# Patient Record
Sex: FEMALE | Race: WHITE | URBAN - METROPOLITAN AREA
[De-identification: names, ages, dates, MRNs, and addresses within clinical notes are randomized per-mention and may not be internally consistent; named-entity substitution may affect disease eponyms.]

---

## 2017-01-01 ENCOUNTER — TELEPHONE (OUTPATIENT)
Dept: FAMILY MEDICINE | Facility: CLINIC | Age: 72
End: 2017-01-01

## 2017-01-01 ENCOUNTER — OFFICE VISIT (OUTPATIENT)
Dept: FAMILY MEDICINE | Facility: CLINIC | Age: 72
End: 2017-01-01
Payer: COMMERCIAL

## 2017-01-01 ENCOUNTER — CARE COORDINATION (OUTPATIENT)
Dept: CASE MANAGEMENT | Facility: CLINIC | Age: 72
End: 2017-01-01

## 2017-01-01 ENCOUNTER — DOCUMENTATION ONLY (OUTPATIENT)
Dept: CASE MANAGEMENT | Facility: CLINIC | Age: 72
End: 2017-01-01

## 2017-01-01 ENCOUNTER — MEDICAL CORRESPONDENCE (OUTPATIENT)
Dept: HEALTH INFORMATION MANAGEMENT | Facility: CLINIC | Age: 72
End: 2017-01-01

## 2017-01-01 ENCOUNTER — TRANSFERRED RECORDS (OUTPATIENT)
Dept: HEALTH INFORMATION MANAGEMENT | Facility: CLINIC | Age: 72
End: 2017-01-01

## 2017-01-01 ENCOUNTER — TELEPHONE (OUTPATIENT)
Dept: CASE MANAGEMENT | Facility: CLINIC | Age: 72
End: 2017-01-01

## 2017-01-01 VITALS
SYSTOLIC BLOOD PRESSURE: 158 MMHG | TEMPERATURE: 99.3 F | DIASTOLIC BLOOD PRESSURE: 102 MMHG | OXYGEN SATURATION: 99 % | HEART RATE: 112 BPM | WEIGHT: 159 LBS | HEIGHT: 67 IN | BODY MASS INDEX: 24.96 KG/M2

## 2017-01-01 VITALS
SYSTOLIC BLOOD PRESSURE: 143 MMHG | BODY MASS INDEX: 24.25 KG/M2 | TEMPERATURE: 97.1 F | HEART RATE: 96 BPM | WEIGHT: 156 LBS | DIASTOLIC BLOOD PRESSURE: 98 MMHG | OXYGEN SATURATION: 100 %

## 2017-01-01 VITALS
HEART RATE: 108 BPM | TEMPERATURE: 97.4 F | WEIGHT: 168 LBS | DIASTOLIC BLOOD PRESSURE: 72 MMHG | SYSTOLIC BLOOD PRESSURE: 118 MMHG | OXYGEN SATURATION: 97 % | BODY MASS INDEX: 26.12 KG/M2

## 2017-01-01 DIAGNOSIS — F41.9 ANXIETY: ICD-10-CM

## 2017-01-01 DIAGNOSIS — I10 ESSENTIAL HYPERTENSION WITH GOAL BLOOD PRESSURE LESS THAN 140/90: ICD-10-CM

## 2017-01-01 DIAGNOSIS — I10 HYPERTENSION GOAL BP (BLOOD PRESSURE) < 140/90: ICD-10-CM

## 2017-01-01 DIAGNOSIS — F10.10 ALCOHOL ABUSE: ICD-10-CM

## 2017-01-01 DIAGNOSIS — M25.512 LEFT SHOULDER PAIN, UNSPECIFIED CHRONICITY: ICD-10-CM

## 2017-01-01 DIAGNOSIS — I10 ESSENTIAL HYPERTENSION WITH GOAL BLOOD PRESSURE LESS THAN 140/90: Primary | ICD-10-CM

## 2017-01-01 DIAGNOSIS — A41.9 SEPSIS (H): Primary | ICD-10-CM
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
## 2017-01-01 DIAGNOSIS — M25.512 LEFT SHOULDER PAIN, UNSPECIFIED CHRONICITY: Primary | ICD-10-CM

## 2017-01-01 DIAGNOSIS — R60.0 BILATERAL EDEMA OF LOWER EXTREMITY: Primary | ICD-10-CM

## 2017-01-01 DIAGNOSIS — R41.3 MEMORY LOSS: ICD-10-CM

## 2017-01-01 DIAGNOSIS — R63.0 ANOREXIA: Primary | ICD-10-CM

## 2017-01-01 DIAGNOSIS — Z12.31 VISIT FOR SCREENING MAMMOGRAM: Primary | ICD-10-CM

## 2017-01-01 LAB
ALBUMIN SERPL-MCNC: 3.4 G/DL (ref 3.4–5)
ALP SERPL-CCNC: 212 U/L (ref 40–150)
ALT SERPL W P-5'-P-CCNC: 14 U/L (ref 0–50)
AST SERPL W P-5'-P-CCNC: 13 U/L (ref 0–45)
BILIRUB DIRECT SERPL-MCNC: <0.1 MG/DL (ref 0–0.2)
BILIRUB SERPL-MCNC: 0.2 MG/DL (ref 0.2–1.3)
PROT SERPL-MCNC: 8.1 G/DL (ref 6.8–8.8)

## 2017-01-01 PROCEDURE — 99214 OFFICE O/P EST MOD 30 MIN: CPT | Performed by: FAMILY MEDICINE

## 2017-01-01 PROCEDURE — 80076 HEPATIC FUNCTION PANEL: CPT | Performed by: FAMILY MEDICINE

## 2017-01-01 PROCEDURE — 99213 OFFICE O/P EST LOW 20 MIN: CPT | Performed by: FAMILY MEDICINE

## 2017-01-01 PROCEDURE — 36415 COLL VENOUS BLD VENIPUNCTURE: CPT | Performed by: FAMILY MEDICINE

## 2017-01-01 RX ORDER — TRAMADOL HYDROCHLORIDE 50 MG/1
50 TABLET ORAL EVERY 8 HOURS PRN
Qty: 30 TABLET | Refills: 0 | Status: SHIPPED | OUTPATIENT
Start: 2017-01-01

## 2017-01-01 RX ORDER — AMLODIPINE BESYLATE 5 MG/1
5 TABLET ORAL DAILY
Qty: 90 TABLET | Refills: 2 | Status: SHIPPED | OUTPATIENT
Start: 2017-01-01 | End: 2017-01-01

## 2017-01-01 RX ORDER — AMLODIPINE BESYLATE 5 MG/1
5 TABLET ORAL DAILY
Qty: 90 TABLET | Refills: 2 | Status: SHIPPED | OUTPATIENT
Start: 2017-01-01

## 2017-01-01 RX ORDER — TRAMADOL HYDROCHLORIDE 50 MG/1
50 TABLET ORAL EVERY 8 HOURS PRN
Qty: 30 TABLET | Refills: 0 | Status: SHIPPED | OUTPATIENT
Start: 2017-01-01 | End: 2017-01-01

## 2017-01-01 RX ORDER — FUROSEMIDE 20 MG
20 TABLET ORAL DAILY
Qty: 30 TABLET | Refills: 0 | Status: SHIPPED | OUTPATIENT
Start: 2017-01-01 | End: 2017-01-01

## 2017-01-01 RX ORDER — HYDROCHLOROTHIAZIDE 12.5 MG/1
12.5 TABLET ORAL DAILY
Qty: 30 TABLET | Refills: 1 | Status: SHIPPED | OUTPATIENT
Start: 2017-01-01

## 2017-01-01 ASSESSMENT — PAIN SCALES - GENERAL
PAINLEVEL: EXTREME PAIN (9)
PAINLEVEL: NO PAIN (0)

## 2017-01-31 NOTE — TELEPHONE ENCOUNTER
Norvasc 5mg      Last Written Prescription Date: 10/04/16  Last Fill Quantity: 90, # refills: 3    Last Office Visit with G, P or [facility] prescribing provider:  11/14/16   Future Office Visit:        BP Readings from Last 3 Encounters:   11/14/16 130/84   10/24/16 134/88   10/04/16 139/82       Thank you -  Davida [place], Pharmacy Technician  [facility]  On Behalf Of [facility]

## 2017-02-01 NOTE — TELEPHONE ENCOUNTER
Prescription approved per [facility] Refill Protocol.  Janice Cano PharmD   [facility]  [email or web address].

## 2017-03-20 NOTE — PROGRESS NOTES
Clinic Care Coordination Contact  [facility]/Voicemail    Referral Source: [facility]  Clinical Data: Care Coordinator Outreach.  Per review of Kian, patient was in [facility] from 3/4/17-3/16/17 after dtr found patient unresponsive in her home and called 911 who transported patient to [facility].  Patient was treated for sepsis secondary to UTI, no-ST elevation MI, acute respiratory failure, acute encephalopathy, acute on chronic renal failure.  Patient has hx of alcohol dependence.   Outreach attempted x 1.  Left message on voicemail with call back information and requested return call.  Plan: Patient has PCP appt scheduled for 3/20/17.  Care Coordinator will attempt another outreach later this morning if no call back from patient.  Edyta Jensen RN   [facility]   [phone]  March 20, 2017

## 2017-03-20 NOTE — TELEPHONE ENCOUNTER
Patient showed up for her appointment today at 3:20 for a 1:20 appointment for hospital follow up and pain in her neck.  Patient came from [place] without any information with her or a ride home.    Triage advised that she will have to rescheduled appointment.  Called [facility] and advised that patient was brought without transportation back there and without paperwork.  And she was almost 2 hours late to be seen.  Unable to see today per Dr. Diaz.  Patient is at baseline and just wanted to follow up with provider after discharge.    Per [place], patient told them her appointment was at 230 advised she still was late.  They stated that she made her own appointment and ride set up.    Appointment made for tomorrow with Dr. Montaño as he had an opening. Patient stated she really does not have a PCP since Dr. Eugene left.  Called [facility] and advised them of appointment tomorrow and need for ride.    Called patients friend Abad per patients request to come and pick her up.    Mariposa Weber RN [facility] Triage.

## 2017-03-21 NOTE — TELEPHONE ENCOUNTER
[facility] called back now and stated that patient had Tylenol and Benadryl hidden in her room.  She took some and they are not sure how much she took.  Patient is a little groggy but alert. She seems per nurse at [facility] to be at her norm cognitive wise.    Advised to call 911 and sent patient to the ER as they do not know how much patient took.  She did have 50 pills  Benadryl and there is only 20 of them left.  Not sure if bottle was full.  Mariposa Weber RN [facility] Triage.

## 2017-03-27 NOTE — LETTER
April 11, 2017    Christy Fenton  [address]   [place] [address]      Dear Christy Fenton,     We have tried to contact you about your health, but have been unable to reach you.  Please call us as soon as possible so we can provide you with the best care possible.  We will continue to check in with you throughout the year to complete these items of care, if you are not able to complete these items at this time.  If you would like to complete the missing items for your care, please contact us at [phone].    We recommend the following:  -schedule a FOLLOWUP OFFICE APPOINTMENT with your provider.        Sincerely,     Your Care Team at [place]

## 2017-03-27 NOTE — LETTER
March 27, 2017    Christy Fenton  [address]   [place] [address]    Dear Christy    We care about your health and have reviewed your health plan. We have reviewed your medical conditions, medication list, and lab results and are making recommendations based on this review, to better manage your health.    You are in particular need of attention regarding:  - Scheduling a Breast Cancer Screening (Mammography) [phone]  - Completing a Colon Cancer Screening (FIT) - Please complete FIT test which we can either mail to you or have you get at appointment to complete and mail completed test to clinic.      Here is a list of Health Maintenance topics that are due now or due soon:  Health Maintenance Due   Topic Date Due     FIT Q1 YR (NO INBASKET)  02/22/1957     URINE DRUG SCREEN Q1 YR  02/22/1962     DEXA Q3 YR  02/22/1977     LIPID MONITORING Q1 YEAR( NO INBASKET)  04/01/2016     MAMMO SCREEN Q2 YR (SYSTEM ASSIGNED)  11/05/2016     FALL RISK ASSESSMENT  12/09/2016     BMP Q6 MOS (NO INBASKET)  03/19/2017     We will be calling you in the next couple of weeks to help you schedule any appointments that are needed.  Please call us at [phone] (or use [facility]) to address the above recommendations.     Thank you for trusting [facility] and we appreciate the opportunity to serve you.  We look forward to supporting your healthcare needs in the future.    Healthy Regards,    MD Smita Torres CMA

## 2017-03-27 NOTE — PROGRESS NOTES
This writer received a voicemail from Farhad at [facility]. He noted Geno Fenton admitted to [facility], and there is no plan for her to return to [facility].     This writer will monitor [facility] discharge report for discharge information    Natali Nazario

## 2017-03-27 NOTE — TELEPHONE ENCOUNTER
Forms received from: [employer]   Phone number listed: [phone]   Fax listed: n/a  Date received: 3-27-17  Form description: verbal orders  Once forms are completed, please return to [employer] via mail.  Is patient requesting to be contacted when forms are completed: n/a  Form placed: provider desk  Carina Mora

## 2017-03-27 NOTE — TELEPHONE ENCOUNTER
Panel Management Review      Patient has the following on her problem list:     Hypertension   Last three blood pressure readings:  BP Readings from Last 3 Encounters:   11/14/16 130/84   10/24/16 134/88   10/04/16 139/82     Blood pressure: Passed    HTN Guidelines:  Age 18-59 BP range:  Less than 140/90  Age 60-85 with Diabetes:  Less than 140/90  Age 60-85 without Diabetes:  less than 150/90      Composite cancer screening  Chart review shows that this patient is due/due soon for the following Mammogram and Fecal Colorectal (FIT)  Summary:    Patient is due/failing the following:   FIT and MAMMOGRAM    Action needed:   Patient needs to schedule a mammogram and complete a fit test orders pended    Type of outreach:    Sent letter. 03/27/2017    Questions for provider review:    None                                                                                                                                    Smita Ervin [facility]       Chart routed to Care Team .

## 2017-04-03 NOTE — TELEPHONE ENCOUNTER
I called and left message for patient to call back regarding scheduling a blood pressure check up and mammogram  Smita Ervin CMA

## 2017-04-04 NOTE — TELEPHONE ENCOUNTER
Forms received from: [employer]   Phone number listed: n/a   Fax listed: n/a  Date received: 4-4-17  Form description: ST order  Once forms are completed, please return to [employer] via mail.  Is patient requesting to be contacted when forms are completed: n/a  Form placed: provider rudy Mora

## 2017-04-11 NOTE — TELEPHONE ENCOUNTER
Forms received from: [employer]   Phone number listed: [phone]  Date received: 4-11-17  Form description: verbal orders  Once forms are completed, please return to [employer] via mail.  Is patient requesting to be contacted when forms are completed: n/a  Form placed: provider rudy Mora

## 2017-04-13 NOTE — TELEPHONE ENCOUNTER
DC'd from [facility] on 4/12 with home health   Primary Problem: Fall  LOS: 8.9   Readmit Risk: High

## 2017-04-14 NOTE — LETTER
[facility] - CARE MANAGEMENT DEPT  [address]  [address]  [place] [address]  Phone: [phone]      April 17, 2017      Christy Fenton  [address]   [place] [address]    Dear Christy,  I am the Clinic Care Coordinator that works with your primary care provider's clinic. I wanted to thank you for spending the time to talk with me.  Below is a description of what Clinic Care Coordination is and how I can further assist you.     The Clinic Care Coordinator role is a Registered Nurse and/or  who understands the health care system. The goal of Clinic Care Coordination is to help you manage your health and improve access to the [facility] system in the most efficient manner.  The Registered Nurse can assist you in meeting your health care goals by providing education, coordinating services, and strengthening the communication among your providers. The  can assist you with financial, behavioral, psychosocial, and chemical dependency and counseling/psychiatric resources.    Please feel free to keep this letter and contact information to contact me at [phone] with any further questions or concerns that may arise. We at [facility] are focused on providing you with the highest-quality healthcare experience possible and that all starts with you.       Sincerely,     Magali Hurst

## 2017-04-17 NOTE — PROGRESS NOTES
Clinic Care Coordination Contact  OUTREACH    Referral Information:  Referral Source: [facility]  Reason for Contact: Follow-up discharge  Care Conference: No     Universal Utilization:   ED Visits in last year: 0  Hospital visits in last year: 4  Last PCP appointment: 11/14/16  Missed Appointments: 46  Concerns: Multiple missed appointments  Multiple Providers or Specialists: psychiatry    Clinical Concerns:  Current Medical Concerns:   Patient Active Problem List   Diagnosis     Advanced directives, counseling/discussion     Hypertension goal BP (blood pressure) < 140/90     Anxiety     Paroxysmal atrial fibrillation (H)     CARDIOVASCULAR SCREENING; LDL GOAL LESS THAN 100     ASCUS favoring benign     Health Care Home     Adjustment disorder with depressed mood     History of ETOH abuse     Other chronic pain     CKD (chronic kidney disease) stage 4, GFR 15-29 ml/min (H)     Memory loss     History of arthroplasty of both hips     Essential hypertension with goal blood pressure less than 140/90       Current Behavioral Concerns: Cognitive/Perceptual concerns    Education Provided to patient: Care Coordination   Clinical Pathway Name: None  Clinical Pathway: None    Medication Management:  Pt is receiving home care, unsure of medication compliance     Functional Status:  Mobility Status: Independent w/Device  Equipment Currently Used at Home: other (see comments) (NA)  Transportation: Unknown     Psychosocial:  Current living arrangement:: I live in assisted living (w/ no services)  Financial/Insurance: [employer] for Seniors, No discussed     Resources and Interventions:  Current Resources: Home Care: [facility], OP Mental Health     Goals: Not developed, Pt declined     Patient/Caregiver understanding: Pt reports she is doing well since discharging. [facility] SN was present at the time of outreach call. Pt denies any concerns. Explained Care Coordination. Will mail out CC flyer.    Frequency  of Care Coordination: as needed     Plan: [facility] will follow-up upon discharge from home care.     BILL Romeo  [facility] Care Coordinator  [phone]

## 2017-04-18 NOTE — PROGRESS NOTES
Patient open to home care through [facility]. Start of care visit was on 4/14.  is Cherelle Alamo RN     This writer will check patient status in [facility] in about 3 weeks.     Natali Nazario

## 2017-04-18 NOTE — TELEPHONE ENCOUNTER
Forms received from: [employer]   Phone number listed: NA   Fax listed: [phone]  Date received: 04/18/2017  Form description: Orders-SN  Once forms are completed, please return to [employer] via fax.  Is patient requesting to be contacted when forms are completed: NA   Form placed: In provider's basket  Michlele Snell

## 2017-04-19 NOTE — TELEPHONE ENCOUNTER
Forms received from: [employer]   Phone number listed: n/a   Fax listed: [phone]  Date received: 4-19-17  Form description: ABIGAIL med, Lexapro  Once forms are completed, please return to [employer] via fax.  Is patient requesting to be contacted when forms are completed: n/a  Form placed: provider desk  Carina Mora

## 2017-04-21 NOTE — TELEPHONE ENCOUNTER
Forms received from: [employer]   Phone number listed: n/a   Fax listed: [phone]  Date received: 4-21-17  Form description: PT orders  Once forms are completed, please return to [employer] via fax.  Is patient requesting to be contacted when forms are completed: n/a  Form placed: provider desk  Carina Mora

## 2017-04-24 NOTE — TELEPHONE ENCOUNTER
Called patient and notified her of message below from provider. Patient stated understanding.    Marilyn Vyas RN  [facility]

## 2017-04-24 NOTE — TELEPHONE ENCOUNTER
Since still able to eat and drink, okay to monitor.  If symptoms worsen, to emergency room.    Advise no alcohol.    Please inform patient.    Gil Menezes MD

## 2017-04-24 NOTE — TELEPHONE ENCOUNTER
Reason for call:  Patient reporting a symptom    Symptom or request:  throwing up phlemg/ patient had a few margaritas and then came home and threw up.  She has had this before but usually goes away but this time it is not.    Duration (how long have symptoms been present): since yesterday    Have you been treated for this before? No    Additional comments:     Phone Number patient can be reached at:  Home number on file [phone] (home)    Best Time:  any    Can we leave a detailed message on this number:  YES    Call taken on 4/24/2017 at 10:25 AM by Cleopatra Mar

## 2017-04-24 NOTE — TELEPHONE ENCOUNTER
Called patient at [phone] (home) to notify her of message below from provider. Unable to reach, left a VM to call back to RN triage line.    Marilyn Vyas RN  [facility]

## 2017-04-24 NOTE — TELEPHONE ENCOUNTER
Called patient at [phone] (home). Patient states she had a few Margaritas last night and has been spitting up clear phlegm since yesterday. Denies blood present in sputum. Patient still able to eat and drink. States this has happened before but usually goes away. This time it has lingered on longer.   Denies: fainting, chest pain, signs of dehydration, pain, confusion, or light-headedness.     Routed to provider to advise if this will pass in time or if medication is indicated.    Marilyn Vyas RN  [facility]

## 2017-04-24 NOTE — TELEPHONE ENCOUNTER
Forms received from: [employer]   Phone number listed: n/a   Fax listed: [phone]  Date received: 4-24-17  Form description: SN orders  Once forms are completed, please return to [employer] via fax.  Is patient requesting to be contacted when forms are completed: n/a  Form placed: provider desk  Carina Mora

## 2017-04-27 NOTE — TELEPHONE ENCOUNTER
Forms received from: [employer]   Phone number listed: n/a   Fax listed: [phone]  Date received: 4-27-17  Form description: POC  Once forms are completed, please return to [employer] via fax.  Is patient requesting to be contacted when forms are completed: n/a  Form placed: provider desk  Carina Mora

## 2017-05-02 NOTE — MR AVS SNAPSHOT
"              After Visit Summary   2017    Christy Fenton    MRN: [id number]           Patient Information     Date Of Birth          1945        Visit Information        Provider Department      2017 1:20 PM Baltazar Diaz MD [facility]        Today's Diagnoses     Bilateral edema of lower extremity    -  1       Follow-ups after your visit        Who to contact     If you have questions or need follow up information about today's clinic visit or your schedule please contact [facility] directly at [phone].  Normal or non-critical lab and imaging results will be communicated to you by [employer], letter or phone within 4 business days after the clinic has received the results. If you do not hear from us within 7 days, please contact the clinic through [employer] or phone. If you have a critical or abnormal lab result, we will notify you by phone as soon as possible.  Submit refill requests through [employer] or call your pharmacy and they will forward the refill request to us. Please allow 3 business days for your refill to be completed.          Additional Information About Your Visit        MyChart Information     [employer] lets you send messages to your doctor, view your test results, renew your prescriptions, schedule appointments and more. To sign up, go to [email or web address] . Click on \"Log in\" on the left side of the screen, which will take you to the Welcome page. Then click on \"Sign up Now\" on the right side of the page.     You will be asked to enter the access code listed below, as well as some personal information. Please follow the directions to create your username and password.     Your access code is: [email or web address]  Expires: 2017 12:38 PM     Your access code will  in 90 days. If you need help or a new code, please call your [facility] or [phone].        Care EveryWhere ID     This is your Care EveryWhere ID. This " could be used by other organizations to access your [facility] medical records  [id number]        Your Vitals Were     Pulse Temperature Pulse Oximetry Breastfeeding? BMI (Body Mass Index)       108 97.4  F (36.3  C) (Oral) 97% No 26.12 kg/m2        Blood Pressure from Last 3 Encounters:   05/02/17 118/72   11/14/16 130/84   10/24/16 134/88    Weight from Last 3 Encounters:   05/02/17 168 lb (76.2 kg)   11/14/16 156 lb (70.8 kg)   10/24/16 152 lb (68.9 kg)              Today, you had the following     No orders found for display         Today's Medication Changes          These changes are accurate as of: 5/2/17  1:55 PM.  If you have any questions, ask your nurse or doctor.               Start taking these medicines.        Dose/Directions    furosemide 20 MG tablet   Commonly known as:  LASIX   Used for:  Bilateral edema of lower extremity   Started by:  Baltazar Diaz MD        Dose:  20 mg   Take 1 tablet (20 mg) by mouth daily   Quantity:  30 tablet   Refills:  0            Where to get your medicines      These medications were sent to [facility] [address]  [address] [facility] [address]     Phone:  [phone]     furosemide 20 MG tablet                Primary Care Provider Office Phone # Fax #    Gil Menezes [phone]       [facility] [address] [facility] [address]        Thank you!     Thank you for choosing [facility]  for your care. Our goal is always to provide you with excellent care. Hearing back from our patients is one way we can continue to improve our services. Please take a few minutes to complete the written survey that you may receive in the mail after your visit with us. Thank you!             Your Updated Medication List - Protect others around you: Learn how to safely use, store and throw away your medicines at  www.disposemymeds.org.          This list is accurate as of: 5/2/17  1:55 PM.  Always use your most recent med list.                   Brand Name Dispense Instructions for use    amLODIPine 5 MG tablet    NORVASC    90 tablet    Take 1 tablet (5 mg) by mouth daily       furosemide 20 MG tablet    LASIX    30 tablet    Take 1 tablet (20 mg) by mouth daily       LORazepam 1 MG tablet    ATIVAN    30 tablet    1 tablet 2 times a day as needed for nausea.       omeprazole 20 MG CR capsule    priLOSEC    90 capsule    Take 1 capsule (20 mg) by mouth daily       OVER-THE-COUNTER      Acetaminophen 650mg       predniSONE 5 MG tablet    DELTASONE    30 tablet    Take 1 tablet (5 mg) by mouth daily       traMADol 50 MG tablet    ULTRAM    120 tablet    Take 1 tablet (50 mg) by mouth every 6 hours as needed for moderate pain       traZODone 100 MG tablet    DESYREL     Take 100 mg by mouth At Bedtime

## 2017-05-02 NOTE — NURSING NOTE
"Chief Complaint   Patient presents with     Swelling     Bilat Feet     Health Maintenance     Fall Risk       Initial BP (!) 148/92  Pulse 108  Temp 97.4  F (36.3  C) (Oral)  Wt 168 lb (76.2 kg)  SpO2 97%  Breastfeeding? No  BMI 26.12 kg/m2 Estimated body mass index is 26.12 kg/(m^2) as calculated from the following:    Height as of 11/14/16: 5' 7.25\" (1.708 m).    Weight as of this encounter: 168 lb (76.2 kg).  Medication Reconciliation: complete   Deonte GRAY      "

## 2017-05-02 NOTE — PATIENT INSTRUCTIONS
Please bring in  The bottles with your medication so I know what you are taking     Recheck 1 week
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
## 2017-05-05 NOTE — TELEPHONE ENCOUNTER
Forms received from: [employer]   Phone number listed: n/a   Fax listed: [phone]  Date received: 5-5-17  Form description: lexapro med  Once forms are completed, please return to [employer] via fax.  Is patient requesting to be contacted when forms are completed: n/a  Form placed: provider desk  Carina Mora

## 2017-05-09 NOTE — TELEPHONE ENCOUNTER
Forms received from: [employer]   Phone number listed: n/a   Fax listed: [phone]  Date received: 5-9-17  Form description: Lasix orders  Once forms are completed, please return to [employer] via fax.  Is patient requesting to be contacted when forms are completed: n/a  Form placed: provider desk  Carina Mora

## 2017-05-15 NOTE — MR AVS SNAPSHOT
"              After Visit Summary   5/15/2017    Christy Fenton    MRN: [id number]           Patient Information     Date Of Birth          1945        Visit Information        Provider Department      5/15/2017 1:40 PM Baltazar Diaz MD [facility]        Today's Diagnoses     Anorexia    -  1    Memory loss          Care Instructions    Have your daughter call and confirm your medication.   You told us you are not taking certain meds and some of the meds you are supposed to be taking are not in the bottles you brought in.         Follow-ups after your visit        Who to contact     If you have questions or need follow up information about today's clinic visit or your schedule please contact [facility] directly at [phone].  Normal or non-critical lab and imaging results will be communicated to you by MyChart, letter or phone within 4 business days after the clinic has received the results. If you do not hear from us within 7 days, please contact the clinic through [employer] or phone. If you have a critical or abnormal lab result, we will notify you by phone as soon as possible.  Submit refill requests through [email or web address] or call your pharmacy and they will forward the refill request to us. Please allow 3 business days for your refill to be completed.          Additional Information About Your Visit        [employer] Information     [email or web address] lets you send messages to your doctor, view your test results, renew your prescriptions, schedule appointments and more. To sign up, go to [email or web address] . Click on \"Log in\" on the left side of the screen, which will take you to the Welcome page. Then click on \"Sign up Now\" on the right side of the page.     You will be asked to enter the access code listed below, as well as some personal information. Please follow the directions to create your username and password.     Your access code is: [email or web address]  Expires: " 2017 12:38 PM     Your access code will  in 90 days. If you need help or a new code, please call your [facility] clinic or [phone].        Care EveryWhere ID     This is your Care EveryWhere ID. This could be used by other organizations to access your [facility] medical records  [id number]        Your Vitals Were     Pulse Temperature Pulse Oximetry Breastfeeding? BMI (Body Mass Index)       96 97.1  F (36.2  C) (Oral) 100% No 24.25 kg/m2        Blood Pressure from Last 3 Encounters:   05/15/17 (!) 143/98   17 118/72   16 130/84    Weight from Last 3 Encounters:   05/15/17 156 lb (70.8 kg)   17 168 lb (76.2 kg)   16 156 lb (70.8 kg)              We Performed the Following     Hepatic panel        Primary Care Provider Office Phone # Fax #    Gil Menezes [phone]       [facility] [address] [place], [place]. \[facility] Outlook.\"" [id number]        Thank you!     Thank you for choosing [facility]  for your care. Our goal is always to provide you with excellent care. Hearing back from our patients is one way we can continue to improve our services. Please take a few minutes to complete the written survey that you may receive in the mail after your visit with us. Thank you!             Your Updated Medication List - Protect others around you: Learn how to safely use, store and throw away your medicines at www.disposemymeds.org.          This list is accurate as of: 5/15/17  2:44 PM.  Always use your most recent med list.                   Brand Name Dispense Instructions for use    amLODIPine 5 MG tablet    NORVASC    90 tablet    Take 1 tablet (5 mg) by mouth daily       furosemide 20 MG tablet    LASIX    30 tablet    Take 1 tablet (20 mg) by mouth daily       omeprazole 20 MG CR capsule    priLOSEC    90 capsule    Take 1 capsule (20 mg) by mouth daily       OVER-THE-COUNTER      Reported on 5/15/2017       predniSONE 5 MG tablet     DELTASONE    30 tablet    Take 1 tablet (5 mg) by mouth daily

## 2017-05-15 NOTE — PATIENT INSTRUCTIONS
Have your daughter call and confirm your medication.   You told us you are not taking certain meds and some of the meds you are supposed to be taking are not in the bottles you brought in.

## 2017-05-15 NOTE — NURSING NOTE
"Chief Complaint   Patient presents with     Nausea     Other     Decreased Appetite x 3 days     Abdominal Pain     Stomache x 3 days       Initial BP (!) 143/98  Pulse 96  Temp 97.1  F (36.2  C) (Oral)  Wt 156 lb (70.8 kg)  SpO2 100%  Breastfeeding? No  BMI 24.25 kg/m2 Estimated body mass index is 24.25 kg/(m^2) as calculated from the following:    Height as of 11/14/16: 5' 7.25\" (1.708 m).    Weight as of this encounter: 156 lb (70.8 kg).  Medication Reconciliation: complete   Deonte GRAY      "
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
## 2017-05-16 NOTE — TELEPHONE ENCOUNTER
Called and advised patient of the message below per provider.  Patient stated that she is feeling a little better today and is tolerating food and fluids.  She will go to ER if she feels worse again.  Appointment made with provider for 5/22/17.  Patient stated understanding and agreeable with the plan of care. Mariposa Weber RN [facility] Triage.

## 2017-05-16 NOTE — TELEPHONE ENCOUNTER
Reason for Call:  Request for results:    Name of test or procedure:  Blood test     Date of test of procedure: 5-15-17    Location of the test or procedure: [facility] to leave the result message on voice mail or with a family member? YES    Phone number Patient can be reached at:  Home number on file [phone] (home)    Additional comments: Anytime    Call taken on 5/16/2017 at 8:52 AM by Mallorie Daniel

## 2017-05-16 NOTE — TELEPHONE ENCOUNTER
Test results are non specific   There is an elevation of alkaline phosphatase which can go up for various reasons   The overall liver functions tests are normal.   I would repeat this in 1 week   If she is feeling worse, she should go to the Emergency room

## 2017-05-17 NOTE — TELEPHONE ENCOUNTER
Forms received from: [employer]   Phone number listed: n/a   Fax listed: [phone]  Date received: 5-17-17  Form description: DC order  Once forms are completed, please return to [employer] via fax.  Is patient requesting to be contacted when forms are completed: n/a  Form placed: provider desk  Carina Mora

## 2017-06-02 NOTE — LETTER
June 2, 2017    Christy Fenton  [address]   [place], [place]. \[facility].\"" [id number]    Dear Christy    We care about your health and have reviewed your health plan. We have reviewed your medical conditions, medication list, and lab results and are making recommendations based on this review, to better manage your health.    You are in particular need of attention regarding:  - Scheduling a Breast Cancer Screening (Mammography) [phone]  - Completing a Colon Cancer Screening (FIT) - Please complete FIT test which can be picked up at the clinic and mail completed test to clinic.      Here is a list of Health Maintenance topics that are due now or due soon:  Health Maintenance Due   Topic Date Due     FIT Q1 YR  02/22/1957     URINE DRUG SCREEN Q1 YR  02/22/1962     DEXA Q3 YR  02/22/1977     LIPID MONITORING Q1 YEAR  04/01/2016     MAMMO SCREEN Q2 YR (SYSTEM ASSIGNED)  11/05/2016     BMP Q6 MOS  03/19/2017     We will be calling you in the next couple of weeks to help you schedule any appointments that are needed.  Please call us at [phone] (or use [employer]) to address the above recommendations.     Thank you for trusting [facility] and we appreciate the opportunity to serve you.  We look forward to supporting your healthcare needs in the future.    Healthy Regards,    Your Care Team

## 2017-06-02 NOTE — TELEPHONE ENCOUNTER
Panel Management Review      Patient has the following on her problem list:     Hypertension   Last three blood pressure readings:  BP Readings from Last 3 Encounters:   05/15/17 (!) 143/98   05/02/17 118/72   11/14/16 130/84     Blood pressure: FAILED    HTN Guidelines:  Age 18-59 BP range:  Less than 140/90  Age 60-85 with Diabetes:  Less than 140/90  Age 60-85 without Diabetes:  less than 150/90      Composite cancer screening  Chart review shows that this patient is due/due soon for the following Mammogram and Fecal Colorectal (FIT)  Summary:    Patient is due/failing the following:   FIT and MAMMOGRAM    Action needed:   Patient needs referral/order: Mammo, FIT    Type of outreach:    Sent letter.    Questions for provider review:    None                                                                                                                                    Macy Sahu MA       Chart routed to Care Team .

## 2017-06-13 NOTE — TELEPHONE ENCOUNTER
Tramadol      Last Written Prescription Date:  11/14/16  Last Fill Quantity: 120   # refills: 5  Last Office Visit with [facility], [facility] or [facility] prescribing provider: 5/15/17  Future Office visit:    Next 5 appointments (look out 90 days)     Jun 14, 2017  3:00 PM CDT   Office Visit with Gil Menezes MD   [facility]    [address] [place] [id number] [phone]                   Routing refill request to provider for review/approval because:  Drug not on the [facility], [facility] or [facility] refill protocol or controlled substance  Drug not active on patient's medication list    Thanks!  Deb Snell CPhT  [facility]  [phone]

## 2017-06-15 NOTE — TELEPHONE ENCOUNTER
Message left on home number for patient to call back TC line.  NTBS before refill, needs fasting labs and appointment with PCP.    Carina COSTA

## 2017-06-21 NOTE — PROGRESS NOTES
SUBJECTIVE:                                                    Christy Fenton is a 72 year old female who presents to clinic today for the following health issues:       Hypertension Follow-up      Outpatient blood pressures are not being checked.    Low Salt Diet: no added salt       Amount of exercise or physical activity: None    Problems taking medications regularly: No    Medication side effects: none    Diet: regular (no restrictions)      none    Problem list and histories reviewed & adjusted, as indicated.  Additional history: as documented         Reviewed and updated as needed this visit by clinical staff  Tobacco  Allergies  Meds  Med Hx  Surg Hx  Fam Hx  Soc Hx      Reviewed and updated as needed this visit by Provider          recently about 4 drinks per day      Restarted amlodipine 3 days ago    Takes generic tylenol / acetaminophen as needed        Physical Exam   Constitutional: She is oriented to person, place, and time and well-developed, well-nourished, and in no distress. No distress.   HENT:   Head: Normocephalic and atraumatic.   Neck: Carotid bruit is not present.   Cardiovascular: Normal rate, regular rhythm, normal heart sounds and intact distal pulses.  Exam reveals no gallop and no friction rub.    No murmur heard.  Pulmonary/Chest: Effort normal and breath sounds normal.   Abdominal: Soft. There is no tenderness.   Musculoskeletal: She exhibits edema (mild symmetric bilat).   Neurological: She is alert and oriented to person, place, and time.   Skin: She is not diaphoretic.   Psychiatric: Mood and affect normal.   full range of motion of right shoulder, not quite on left    ASSESSMENT / PLAN:  (M25.512) Left shoulder pain, unspecified chronicity  (primary encounter diagnosis)  Comment: encouraged more range of motion exercises.  Use tylenol prn but specified 2000 mg is max 24hour total dose   Plan: traMADol (ULTRAM) 50 MG tablet        Use the tramadol only very sparingly.  No  history of seizures.  No narcotics.     (F10.10) Alcohol abuse  Comment: discussed in detail.   Plan: advised complete cessation     (I10) Hypertension goal BP (blood pressure) < 140/90  Comment: add low dose hctz to regimen   Plan: hydrochlorothiazide 12.5 MG TABS tablet             (I10) Essential hypertension with goal blood pressure less than 140/90  Comment: continue this   Plan: amLODIPine (NORVASC) 5 MG tablet           Anxiety: ongoing.  Sees psychiatry    I reviewed the patient's medications, allergies, medical history, family history, and social history.    Gil Menezes MD

## 2017-06-21 NOTE — NURSING NOTE
"Chief Complaint   Patient presents with     Hypertension     Shoulder Pain     Health Maintenance       Initial BP (!) 161/114 (BP Location: Left arm, Patient Position: Chair, Cuff Size: Adult Regular)  Pulse 112  Temp 99.3  F (37.4  C) (Oral)  Ht 5' 7.25\" (1.708 m)  Wt 159 lb (72.1 kg)  SpO2 99%  Breastfeeding? No  BMI 24.72 kg/m2 Estimated body mass index is 24.72 kg/(m^2) as calculated from the following:    Height as of this encounter: 5' 7.25\" (1.708 m).    Weight as of this encounter: 159 lb (72.1 kg).  Medication Reconciliation: complete   Smita Ervin CMA      "

## 2017-06-21 NOTE — MR AVS SNAPSHOT
"              After Visit Summary   6/21/2017    Christy Fenton    MRN: [id number]           Patient Information     Date Of Birth          1945        Visit Information        Provider Department      6/21/2017 2:00 PM Gil Menezes MD [facility]        Today's Diagnoses     Left shoulder pain, unspecified chronicity    -  1    Alcohol abuse        Hypertension goal BP (blood pressure) < 140/90        Essential hypertension with goal blood pressure less than 140/90          Care Instructions    Maximum dose of acetaminophen ( tylenol ) in 24 hours is 2000 mg    Use occasional tramadol only rarely as needed for more severe pain    Stay on amlodipine    Start hydrochlorothiazide    See us in 1-2 months    NO alcohol!              Follow-ups after your visit        Who to contact     If you have questions or need follow up information about today's clinic visit or your schedule please contact [facility] directly at [phone].  Normal or non-critical lab and imaging results will be communicated to you by MyChart, letter or phone within 4 business days after the clinic has received the results. If you do not hear from us within 7 days, please contact the clinic through MyChart or phone. If you have a critical or abnormal lab result, we will notify you by phone as soon as possible.  Submit refill requests through [employer] or call your pharmacy and they will forward the refill request to us. Please allow 3 business days for your refill to be completed.          Additional Information About Your Visit        MyChart Information     [employer] lets you send messages to your doctor, view your test results, renew your prescriptions, schedule appointments and more. To sign up, go to [email or web address] . Click on \"Log in\" on the left side of the screen, which will take you to the Welcome page. Then click on \"Sign up Now\" on the right side of the page.     You will be " "asked to enter the access code listed below, as well as some personal information. Please follow the directions to create your username and password.     Your access code is: [email or web address]  Expires: 2017  1:47 PM     Your access code will  in 90 days. If you need help or a new code, please call your [facility] clinic or [phone].        Care EveryWhere ID     This is your Care EveryWhere ID. This could be used by other organizations to access your [facility] medical records  [id number]        Your Vitals Were     Pulse Temperature Height Pulse Oximetry Breastfeeding? BMI (Body Mass Index)    112 99.3  F (37.4  C) (Oral) 5' 7.25\" (1.708 m) 99% No 24.72 kg/m2       Blood Pressure from Last 3 Encounters:   17 (!) 158/102   05/15/17 (!) 143/98   17 118/72    Weight from Last 3 Encounters:   17 159 lb (72.1 kg)   05/15/17 156 lb (70.8 kg)   17 168 lb (76.2 kg)              Today, you had the following     No orders found for display         Today's Medication Changes          These changes are accurate as of: 17  2:24 PM.  If you have any questions, ask your nurse or doctor.               Start taking these medicines.        Dose/Directions    hydrochlorothiazide 12.5 MG Tabs tablet   Used for:  Hypertension goal BP (blood pressure) < 140/90   Started by:  Gil Menezes MD        Dose:  12.5 mg   Take 1 tablet (12.5 mg) by mouth daily   Quantity:  30 tablet   Refills:  1       traMADol 50 MG tablet   Commonly known as:  ULTRAM   Used for:  Left shoulder pain, unspecified chronicity   Started by:  Gil Menezes MD        Dose:  50 mg   Take 1 tablet (50 mg) by mouth every 8 hours as needed for moderate pain   Quantity:  30 tablet   Refills:  0            Where to get your medicines      These medications were sent to [facility] [place], [place] - [address]  [address], [place] [address]     Phone:  [phone]     " amLODIPine 5 MG tablet    hydrochlorothiazide 12.5 MG Tabs tablet         Some of these will need a paper prescription and others can be bought over the counter.  Ask your nurse if you have questions.     Bring a paper prescription for each of these medications     traMADol 50 MG tablet                Primary Care Provider Office Phone # Fax #    Gil Menezes [phone]       [facility] [address] [facility] [address]        Equal Access to Services     DUNIA PENNY : Hadii aad ku hadasho Soomaali, waaxda luqadaha, qaybta kaalmada adeegyada, waxay idiin hayaan adeeg kharash lamckayla . So [facility] [phone].    ATENCIÓN: Si habla espraquel, tiene a givens disposición servicios gratuitos de asistencia lingüística. [facility] [phone].    We comply with applicable federal civil rights laws and [place] laws. We do not discriminate on the basis of race, color, national origin, age, disability sex, sexual orientation or gender identity.            Thank you!     Thank you for choosing [facility]  for your care. Our goal is always to provide you with excellent care. Hearing back from our patients is one way we can continue to improve our services. Please take a few minutes to complete the written survey that you may receive in the mail after your visit with us. Thank you!             Your Updated Medication List - Protect others around you: Learn how to safely use, store and throw away your medicines at www.disposemymeds.org.          This list is accurate as of: 6/21/17  2:24 PM.  Always use your most recent med list.                   Brand Name Dispense Instructions for use Diagnosis    amLODIPine 5 MG tablet    NORVASC    90 tablet    Take 1 tablet (5 mg) by mouth daily    Essential hypertension with goal blood pressure less than 140/90       hydrochlorothiazide 12.5 MG Tabs tablet     30 tablet    Take 1 tablet (12.5 mg) by mouth daily     Hypertension goal BP (blood pressure) < 140/90       traMADol 50 MG tablet    ULTRAM    30 tablet    Take 1 tablet (50 mg) by mouth every 8 hours as needed for moderate pain    Left shoulder pain, unspecified chronicity

## 2017-06-21 NOTE — PATIENT INSTRUCTIONS
Maximum dose of acetaminophen ( tylenol ) in 24 hours is 2000 mg    Use occasional tramadol only rarely as needed for more severe pain    Stay on amlodipine    Start hydrochlorothiazide    See us in 1-2 months    NO alcohol!

## 2017-06-22 NOTE — TELEPHONE ENCOUNTER
6/22/17- 1st call attempt. Scheduled appointment for July 6th at 12:45 at [place]. Closed encounter.  Macy Sahu MA

## 2017-06-27 NOTE — TELEPHONE ENCOUNTER
Reason for call:  Patient reporting a symptom    Symptom or request: Patient calling stating the pain medication that she received last week for her back and knee pain is not working. Patient is very frustrated with the pain she is having.     Duration (how long have symptoms been present): ongoing    Have you been treated for this before? Yes    Additional comments: Patient uses [facility] on [address]    Phone Number patient can be reached at:  Home number on file [phone] (home)    Best Time:  any    Can we leave a detailed message on this number:  YES    Call taken on 6/27/2017 at 10:08 AM by Michelle Snell

## 2017-06-27 NOTE — TELEPHONE ENCOUNTER
Attempted to call patient at home number, left message on answering service requesting call back to clinic to discuss.  Sharon Knox RN  [facility] [place]

## 2017-06-27 NOTE — TELEPHONE ENCOUNTER
Attempted to call patient at home number, left message on answering service requesting call back to clinic to discuss.  Sharon Knox RN  [facility]

## 2017-06-27 NOTE — TELEPHONE ENCOUNTER
"Called patient relayed below information, patient states made an appointment for July and \"so I am supposed to just live with this pain\" and then hung up the phone.  Apt is July 21.    Sherly Leblanc RN  [facility]      "

## 2017-06-27 NOTE — TELEPHONE ENCOUNTER
"Patient was seen by Dr. Menezes 6/21/17 for left shoulder pain, visit does not indicate how long but I do see chronic pain on problem list.    See plan from that visit:       Care Instructions    Maximum dose of acetaminophen ( tylenol ) in 24 hours is 2000 mg     Use occasional tramadol only rarely as needed for more severe pain     Stay on amlodipine     Start hydrochlorothiazide     See us in 1-2 months     NO alcohol!        I called patient to discuss; she states she has had chronic low back, left knee, and left shoulder pain.   States her back is bothering her the worst.   She has a hard time picking up her grandson and has to use a walker if she walks outside her apartment.   Denies acute injury.   Denies knee is red, swollen, or warm.  She states she drinks 2 beers per night (I advised her Dr. Menezes did not want her to drink at all); she states \"I am not an alcoholic but what am I supposed to do?   I need to have something to help the pain at night\".  She states she uses a few tylenol every day and one tramadol every 8 hours.   Those help a bit but still unhappy with her level of pain.    She states she might check with a chiropractor in her neighborhood or might be interested in PT referral.   I advised insurance sometimes does not cover chiropractor.  Advised her Dr. Menezes out of the clinic this week but would route to covering providers to see if something short term for better pain relief can be prescribed or PT referral placed.    Routed to Dr. Menezes/covering pool.    Pharmacy selected.    Sharon Knox RN  [facility]      "

## 2017-07-06 NOTE — TELEPHONE ENCOUNTER
Tramadol 50mg      Last Written Prescription Date: 06-21-17  Last Fill Quantity: 30,  # refills: 0   Last Office Visit with [facility] or [facility] prescribing provider: 06-21-17                                         Next 5 appointments (look out 90 days)     Jul 07, 2017  2:20 PM CDT   SHORT with Gil Menezes MD   [facility] ([facility]    [address] [place] [id number] [phone]            Jul 21, 2017  4:20 PM CDT   Office Visit with Gil Menezes MD   [facility] ([facility])    [address] [place] [address]   [phone]                  KATHRYN Hardin  [facility]   Ext #[phone], [phone]  #2

## 2017-07-11 NOTE — PROGRESS NOTES
"Clinic Care Coordination Contact  OUTREACH    Referral Information:  Referral Source: [facility]  Reason for Contact: Follow-up  Care Conference: No     Universal Utilization:   ED Visits in last year: 0  Hospital visits in last year: 4  Last PCP appointment: 06/21/17  Missed Appointments: 46  Concerns: Multiple missed appointments  Multiple Providers or Specialists: Psychiatry    Clinical Concerns:  Current Medical Concerns:   Patient Active Problem List   Diagnosis     Advanced directives, counseling/discussion     Hypertension goal BP (blood pressure) < 140/90     Anxiety     Paroxysmal atrial fibrillation (H)     CARDIOVASCULAR SCREENING; LDL GOAL LESS THAN 100     ASCUS favoring benign     Health Care Home     Adjustment disorder with depressed mood     History of ETOH abuse     Other chronic pain     CKD (chronic kidney disease) stage 4, GFR 15-29 ml/min (H)     Memory loss     History of arthroplasty of both hips     Essential hypertension with goal blood pressure less than 140/90       Education Provided to patient: Chiropractor, Pain Management   Clinical Pathway Name: None  Clinical Pathway: Clinic Care Coordinator - Depression Follow-Up Assessment   Pt identified current zone as: Red  Pt is not in current crisis  Pt has not voiced thoughts of harm to self or others. Is frustrated with pain levels and stated \"i can't take it anymore. I just want to die.\"    Medication Management:  Patient reports adherence, frustrated with current pain medication, doesn't help with the pain, over the counter medication has been more effective     Functional Status:  Mobility Status: Independent w/Device  Equipment Currently Used at Home: other (see comments) (NA)  Transportation: Utilizes Senior ride service     Psychosocial:  Current living arrangement:: I live in assisted living (w/ no services)  Financial/Insurance: [facility] [employer] [facility], Financial concerns not discussed     Resources and Interventions:  Current " "Resources: Home Care w/ [facility] Completed; OP Mental Health  Advanced Care Plans/Directives on file:: No    Patient/Caregiver understanding: Spoke with patient who reports not doing well. Pt reports unbearable back pain. Inquired about current back pain treatment and coming in to see the provider to reassess medication regimen and patient stated, \"I'm not going to no doctors anymore, they don't do nothing for me.\" BRADY FARMER agreed to speak with the pt's PCP to get some ideas on alternatives/ideas. Pt does have an upcoming appointment on 7/12 but has had 47 canceled/missed appointments through the course of her history with the [facility] concerned that she will attend the scheduled appointment. Suggested visiting a pain clinic. Pt reports that she has a friend who found the clinic unhelpful and would prefer to see what Dr. Menezes has to say. Pt did mention an interest in seeing a chiropractor. Pt agreed to see if there is a provider near her.   Frequency of Care Coordination: 1-2 weeks  Upcoming appointment: 07/21/17     Plan: BRADY FARMER to find a Chiropractor in patient's area. Discuss alternative pain treatment with PCP and touch base with patient prior to scheduled appointment.    BILL Romeo  [facility]   [phone]  [email or web address]        "

## 2017-07-12 NOTE — PROGRESS NOTES
Keep appointment for the 21st, 9 days from now.  We may want to look at physical therapy type referral.    Gil Menezes MD

## 2017-07-12 NOTE — PROGRESS NOTES
Okay thank you. I'm not sure she will show up, but I will mail her an appointment reminder. I also provided her with a Chiropractor that is located across the street from her apartment.     Magali Hurst [facility]  Clinic Care Coordinator  [phone]  [email or web address]

## 2017-08-03 NOTE — TELEPHONE ENCOUNTER
Forms received from: "[employer]   Phone number listed: [phone]   Fax listed: [phone]  Date received: 8-3-17  Form description: DC summary  Once forms are completed, please return to "[employer] via mail.  Is patient requesting to be contacted when forms are completed: n/a  Form placed: provider desk  Carina Mora

## 2017-08-10 ENCOUNTER — TELEPHONE (OUTPATIENT)
Dept: FAMILY MEDICINE | Facility: CLINIC | Age: 72
End: 2017-08-10

## 2017-08-10 NOTE — TELEPHONE ENCOUNTER
8/10/2017    Call Regarding Preventive Health Screening Colonoscopy and Mammogram    Attempt 1    Message on voicemail     Comments:           Outreach   Laisha Hanson

## 2017-08-25 ENCOUNTER — CARE COORDINATION (OUTPATIENT)
Dept: CARE COORDINATION | Facility: CLINIC | Age: 72
End: 2017-08-25

## 2017-08-25 NOTE — PROGRESS NOTES
Clinic Care Coordination Contact  [facility]/Voicemail    Referral Source: [facility]  Clinical Data: Care Coordinator Outreach  Outreach attempted x 1.  Left message on voicemail with call back information and requested return call.  Plan: Care Coordinator mailed out [facility] letter on August 25, 2017. Care Coordinator will try to reach patient again in 3-4 weeks.    BILL Romeo  [facility]   [phone]  [email or web address]

## 2017-08-25 NOTE — LETTER
[facility]ARE [facility]ORDIN[facility]  [address] [address]  Phone: [phone]      August 25, 2017      Christy Fenton  [address]   [facility] [facility] [address]    Dear Christy,  I am the Clinic Care Coordinator that works with your primary care provider's clinic. I recently tried to call and was unable to reach you. I worked with you several weeks ago and it is my understanding you were recently discharged from [facility]. I was hoping to discuss with you how you've been and if there is anything I can assist you with.    I included a flyer with a description of what Clinic Care Coordination is and how I can further assist you.     Please feel free to keep this letter and contact information to contact me at [phone] with any further questions or concerns that may arise.       Sincerely,     Magali Hurst

## 2017-09-15 ENCOUNTER — CARE COORDINATION (OUTPATIENT)
Dept: CARE COORDINATION | Facility: CLINIC | Age: 72
End: 2017-09-15

## 2017-09-15 NOTE — PROGRESS NOTES
Clinic Care Coordination Contact    Situation: Patient chart reviewed by care coordinator. [facility] was hospitalized at [facility] 8/1/2017 - 8/6/2017. Christy Fenton was pronounced dead at 0527 8/6/2017.       BILL Romeo  [facility]   [phone]  [email or web address]
